# Patient Record
Sex: FEMALE | Race: WHITE | Employment: OTHER | URBAN - METROPOLITAN AREA
[De-identification: names, ages, dates, MRNs, and addresses within clinical notes are randomized per-mention and may not be internally consistent; named-entity substitution may affect disease eponyms.]

---

## 2018-02-13 ENCOUNTER — THERAPY VISIT (OUTPATIENT)
Dept: PHYSICAL THERAPY | Facility: CLINIC | Age: 73
End: 2018-02-13
Payer: COMMERCIAL

## 2018-02-13 DIAGNOSIS — G89.29 CHRONIC RIGHT SHOULDER PAIN: Primary | ICD-10-CM

## 2018-02-13 DIAGNOSIS — M25.511 CHRONIC RIGHT SHOULDER PAIN: Primary | ICD-10-CM

## 2018-02-13 PROCEDURE — 97161 PT EVAL LOW COMPLEX 20 MIN: CPT | Mod: GP | Performed by: PHYSICAL THERAPIST

## 2018-02-13 PROCEDURE — G8985 CARRY GOAL STATUS: HCPCS | Mod: GP | Performed by: PHYSICAL THERAPIST

## 2018-02-13 PROCEDURE — G8984 CARRY CURRENT STATUS: HCPCS | Mod: GP | Performed by: PHYSICAL THERAPIST

## 2018-02-13 PROCEDURE — 97110 THERAPEUTIC EXERCISES: CPT | Mod: GP | Performed by: PHYSICAL THERAPIST

## 2018-02-13 NOTE — PROGRESS NOTES
[facility] Initial Evaluation  Subjective:  Patient is a 72 year old female presenting with rehab right shoulder hpi.   Desire Stark is a 72 year old female with a right shoulder condition.  Occurance: Patient reports she played years of softball and has had pain for years that is just getting worse.  Condition occurred: for unknown reasons.  This is a chronic condition  2/1/18 Date of MD order  .    Patient reports pain:  Upper arm, anterior and posterior.  Radiates to:  Shoulder and upper arm.  Pain is described as stabbing and is intermittent and reported as 7/10.  Associated symptoms:  Loss of strength and loss of motion/stiffness. Pain is the same all the time.  Exacerbated by: taking a kettle of stove, lifting arm to curl hair, lifting a gallon a milk out of the fridge, unhooking her bra. Relieved by: biofreeze.  Since onset symptoms are gradually worsening.        General health as reported by patient is good.  Pertinent medical history includes:  Overweight and osteoarthritis.  Medical allergies: no.  Other surgeries include:  Heart surgery (2 stints).  Current medications:  Cardiac medication.  Current occupation is retired.    Employment tasks: househould tasks.    Barriers include:  None as reported by the patient.    Red flags:  None as reported by the patient.                        Objective:  Standing Alignment:    Cervical/Thoracic:  Forward head  Shoulder/UE:  Rounded shoulders                                       Shoulder Evaluation:  ROM:  AROM:    Flexion:  Left:  158    Right:  122  Extension: Left: 80Right: 29  Abduction:  Left: 170   Right:  52      External Rotation:  Left:  75    Right:  30            Extension/Internal Rotation:  Left:  T7    Right:  Right gluteal    PROM:    Flexion:  Right: 144 empty end feel    Extension:  Right:  30  Abduction:  Right:  88 with empty end feel      External Rotation:  Right:  30 with empty end feel                    Strength:     Flexion: Left:5/5 Strong/pain free    Pain:    Right: 4/5  Weak/painful     Pain:   Extension:  Left: 5/5  Strong/pain free    Pain:      Abduction:  Left: 5/5  Strong/pain free  Pain:    Right: 3/5   Weak/painful    Pain:    Internal Rotation:  Left:5/5   Strong/pain free    Pain:    Right: 2/5   Weak/painful    Pain:  External Rotation:   Left:5/5   Strong/pain free    Pain:   Right:3-/5   Weak/painful    Pain:        Elbow Flexion:  Left:5/5   Strong/pain free    Pain:    Right:3+/5   Weak/painful    Pain:  Elbow Extension:  Left:5/5   Strong/pain free    Pain:    Right:3/5   Weak/painful    Pain:    Special Tests:      Right shoulder positive for the following special tests:Impingement; Neural Tension and Rotator cuff tear  Palpation:      Right shoulder tenderness present at: Supraspinatus and Subscapularis  Right shoulder tenderness not present at:Infraspinatus; Deltoid; Levator; Upper Trap or Bicipital Groove                                     Anurag Cervical Evaluation      Movement Loss:  Protrusion (PRO): nil  Flexion (Flex): nil  Retraction (RET): nil  Extension (EXT): nil  Lateral Flexion Right (LF R): mod  Lateral Flexion Left (LF L): major  Rotation Right (ROT R): nil  Rotation Left (ROT L): nil  Test Movements:        RET EXT: During: no effect  After: no effect    Repeat RET EXT: During: no effect  After: no effect                                                                     ROS    Assessment/Plan:    Patient is a 72 year old female with right side shoulder complaints.    Patient has the following significant findings with corresponding treatment plan.                Diagnosis 1:  Right shoulder pain / GH arthritis  Pain -  hot/cold therapy, US, manual therapy, self management, education, directional preference exercise and home program  Decreased ROM/flexibility - manual therapy, therapeutic exercise, therapeutic activity and home program  Decreased joint mobility - manual therapy,  therapeutic exercise, therapeutic activity and home program  Decreased strength - therapeutic exercise, therapeutic activities and home program  Decreased function - therapeutic activities and home program  Impaired posture - neuro re-education, therapeutic activities and home program    Therapy Evaluation Codes:   1) History comprised of:   Personal factors that impact the plan of care:      Age and Time since onset of symptoms.    Comorbidity factors that impact the plan of care are:      Osteoarthritis.     Medications impacting care: Cardiac.  2) Examination of Body Systems comprised of:   Body structures and functions that impact the plan of care:      Shoulder.   Activity limitations that impact the plan of care are:      Bathing, Dressing, Lifting and Sleeping.  3) Clinical presentation characteristics are:   Stable/Uncomplicated.  4) Decision-Making    Low complexity using standardized patient assessment instrument and/or measureable assessment of functional outcome.  Cumulative Therapy Evaluation is: Low complexity.    Previous and current functional limitations:  (See Goal Flow Sheet for this information)    Short term and Long term goals: (See Goal Flow Sheet for this information)     Communication ability:  Patient appears to be able to clearly communicate and understand verbal and written communication and follow directions correctly.  Treatment Explanation - The following has been discussed with the patient:   RX ordered/plan of care  Anticipated outcomes  Possible risks and side effects  This patient would benefit from PT intervention to resume normal activities.   Rehab potential is good.    Frequency:  1 X week, once daily  Duration:  for 6 weeks  Discharge Plan:  Achieve all LTG.  Independent in home treatment program.  Reach maximal therapeutic benefit.    Please refer to the daily flowsheet for treatment today, total treatment time and time spent performing 1:1 timed codes.

## 2018-02-13 NOTE — LETTER
[facility] PHYSICAL THERAPY  [address]  [facility] [address]  [phone]    2018    Re: Desire Stark   :   1945  MRN:  [id number]   REFERRING PHYSICIAN:   Yuki Kaiser    [facility] PHYSICAL THERAPY  Date of Initial Evaluation:  18  Visits:  Rxs Used: 1  Reason for Referral:  Chronic right shoulder pain    EVALUATION SUMMARY    [place] [facility] Initial Evaluation    Subjective:  Patient is a 72 year old female presenting with rehab right shoulder hpi.   Desire Stark is a 72 year old female with a right shoulder condition.  Occurance: Patient reports she played years of softball and has had pain for years that is just getting worse.  Condition occurred: for unknown reasons. This is a chronic condition  18 Date of MD order.      Patient reports pain:  Upper arm, anterior and posterior.  Radiates to:  Shoulder and upper arm.  Pain is described as stabbing and is intermittent and reported as 7/10.  Associated symptoms:  Loss of strength and loss of motion/stiffness. Pain is the same all the time.  Exacerbated by: taking a kettle of stove, lifting arm to curl hair, lifting a gallon a milk out of the fridge, unhooking her bra. Relieved by: biofreeze.  Since onset symptoms are gradually worsening.  General health as reported by patient is good.      Pertinent medical history includes:  Overweight and osteoarthritis. Medical allergies: no.  Other surgeries include:  Heart surgery (2 stints).  Current medications:  Cardiac medication.  Current occupation is retired. Employment tasks: househould tasks.    Barriers include:  None as reported by the patient.    Red flags:  None as reported by the patient.    Objective:  Standing Alignment:    Cervical/Thoracic:  Forward head  Shoulder/UE:  Rounded shoulders    Shoulder Evaluation:  ROM:  AROM:    Flexion:  Left:  158    Right:  122  Extension: Left: 80Right: 29  Abduction:   Left: 170   Right:  52  External Rotation:  Left:  75    Right:  30  Extension/Internal Rotation:  Left:  T7    Right:  Right gluteal      PROM:    Flexion:  Right: 144 empty end feel    Extension:  Right:  30  Abduction:  Right:  88 with empty end feel  External Rotation:  Right:  30 with empty end feel    Strength:    Flexion: Left:5/5 Strong/pain free    Pain:    Right: 4/5  Weak/painful     Pain:   Extension:  Left: 5/5  Strong/pain free    Pain:      Abduction:  Left: 5/5  Strong/pain free  Pain:    Right: 3/5   Weak/painful    Pain:    Internal Rotation:  Left:5/5   Strong/pain free    Pain:    Right: 2/5   Weak/painful    Pain:  External Rotation:   Left:5/5   Strong/pain free    Pain:   Right:3-/5   Weak/painful    Pain:        Elbow Flexion:  Left:5/5   Strong/pain free    Pain:    Right:3+/5   Weak/painful    Pain:  Elbow Extension:  Left:5/5   Strong/pain free    Pain:    Right:3/5   Weak/painful    Pain:    Special Tests:    Right shoulder positive for the following special tests:Impingement; Neural Tension and Rotator cuff tear    Palpation:    Right shoulder tenderness present at: Supraspinatus and Subscapularis  Right shoulder tenderness not present at:Infraspinatus; Deltoid; Levator; Upper Trap or Bicipital Groove    Anurag Cervical Evaluation    Movement Loss:  Protrusion (PRO): nil  Flexion (Flex): nil  Retraction (RET): nil  Extension (EXT): nil  Lateral Flexion Right (LF R): mod  Lateral Flexion Left (LF L): major  Rotation Right (ROT R): nil  Rotation Left (ROT L): nil    Test Movements:  RET EXT: During: no effect  After: no effect    Repeat RET EXT: During: no effect  After: no effect      Assessment/Plan:    Patient is a 72 year old female with right side shoulder complaints.    Patient has the following significant findings with corresponding treatment plan.                  Diagnosis 1:  Right shoulder pain / GH arthritis  Pain -  hot/cold therapy, US, manual therapy, self management,  education, directional preference exercise and home program  Decreased ROM/flexibility - manual therapy, therapeutic exercise, therapeutic activity and home program  Decreased joint mobility - manual therapy, therapeutic exercise, therapeutic activity and home program  Decreased strength - therapeutic exercise, therapeutic activities and home program  Decreased function - therapeutic activities and home program  Impaired posture - neuro re-education, therapeutic activities and home program    Therapy Evaluation Codes:   1) History comprised of:   Personal factors that impact the plan of care:      Age and Time since onset of symptoms.    Comorbidity factors that impact the plan of care are:      Osteoarthritis.     Medications impacting care: Cardiac.  2) Examination of Body Systems comprised of:   Body structures and functions that impact the plan of care:      Shoulder.   Activity limitations that impact the plan of care are:      Bathing, Dressing, Lifting and Sleeping.  3) Clinical presentation characteristics are:   Stable/Uncomplicated.  4) Decision-Making    Low complexity using standardized patient assessment instrument and/or measureable assessment of functional outcome.  Cumulative Therapy Evaluation is: Low complexity.    Previous and current functional limitations:  (See Goal Flow Sheet for this information)    Short term and Long term goals: (See Goal Flow Sheet for this information)     Communication ability:  Patient appears to be able to clearly communicate and understand verbal and written communication and follow directions correctly.    Treatment Explanation - The following has been discussed with the patient:   RX ordered/plan of care  Anticipated outcomes  Possible risks and side effects    This patient would benefit from PT intervention to resume normal activities.   Rehab potential is good.  Frequency:  1 X week, once daily  Duration:  for 6 weeks  Discharge Plan:  Achieve all  LTG.  Independent in home treatment program.  Reach maximal therapeutic benefit.    Thank you for your referral.    INQUIRIES  Therapist: Pee Bailon DPT  [facility] PHYSICAL THERAPY  [address]  [facility] [address]  Phone: [phone]  Fax: [phone]

## 2019-02-06 PROBLEM — M25.511 CHRONIC RIGHT SHOULDER PAIN: Status: RESOLVED | Noted: 2018-02-13 | Resolved: 2019-02-06

## 2019-02-06 PROBLEM — G89.29 CHRONIC RIGHT SHOULDER PAIN: Status: RESOLVED | Noted: 2018-02-13 | Resolved: 2019-02-06

## 2019-10-25 ENCOUNTER — THERAPY VISIT (OUTPATIENT)
Dept: PHYSICAL THERAPY | Facility: CLINIC | Age: 74
End: 2019-10-25
Payer: COMMERCIAL

## 2019-10-25 DIAGNOSIS — M25.552 HIP PAIN, LEFT: ICD-10-CM

## 2019-10-25 DIAGNOSIS — M70.62 TROCHANTERIC BURSITIS OF LEFT HIP: ICD-10-CM

## 2019-10-25 DIAGNOSIS — M16.12 PRIMARY OSTEOARTHRITIS OF LEFT HIP: ICD-10-CM

## 2019-10-25 PROCEDURE — 97035 APP MDLTY 1+ULTRASOUND EA 15: CPT | Mod: GP | Performed by: PHYSICAL THERAPIST

## 2019-10-25 PROCEDURE — 97161 PT EVAL LOW COMPLEX 20 MIN: CPT | Mod: GP | Performed by: PHYSICAL THERAPIST

## 2019-10-25 PROCEDURE — 97530 THERAPEUTIC ACTIVITIES: CPT | Mod: GP | Performed by: PHYSICAL THERAPIST

## 2019-10-25 PROCEDURE — 97110 THERAPEUTIC EXERCISES: CPT | Mod: GP | Performed by: PHYSICAL THERAPIST

## 2019-10-25 ASSESSMENT — ACTIVITIES OF DAILY LIVING (ADL)
WALKING_APPROXIMATELY_10_MINUTES: EXTREME DIFFICULTY
STEPPING_UP_AND_DOWN_CURBS: MODERATE DIFFICULTY
WALKING_INITIALLY: MODERATE DIFFICULTY
HOS_ADL_SCORE(%): 33.82
HOS_ADL_ITEM_SCORE_TOTAL: 23
DEEP_SQUATTING: EXTREME DIFFICULTY
GETTING_INTO_AND_OUT_OF_AN_AVERAGE_CAR: MODERATE DIFFICULTY
RECREATIONAL_ACTIVITIES: EXTREME DIFFICULTY
WALKING_DOWN_STEEP_HILLS: EXTREME DIFFICULTY
ROLLING_OVER_IN_BED: MODERATE DIFFICULTY
SITTING_FOR_15_MINUTES: MODERATE DIFFICULTY
LIGHT_TO_MODERATE_WORK: MODERATE DIFFICULTY
GOING_UP_1_FLIGHT_OF_STAIRS: EXTREME DIFFICULTY
WALKING_15_MINUTES_OR_GREATER: EXTREME DIFFICULTY
HEAVY_WORK: EXTREME DIFFICULTY
HOS_ADL_HIGHEST_POTENTIAL_SCORE: 68
HOS_ADL_COUNT: 17
PUTTING_ON_SOCKS_AND_SHOES: EXTREME DIFFICULTY
STANDING_FOR_15_MINUTES: EXTREME DIFFICULTY
GOING_DOWN_1_FLIGHT_OF_STAIRS: EXTREME DIFFICULTY
TWISTING/PIVOTING_ON_INVOLVED_LEG: MODERATE DIFFICULTY
WALKING_UP_STEEP_HILLS: EXTREME DIFFICULTY
GETTING_INTO_AND_OUT_OF_A_BATHTUB: EXTREME DIFFICULTY
HOW_WOULD_YOU_RATE_YOUR_CURRENT_LEVEL_OF_FUNCTION_DURING_YOUR_USUAL_ACTIVITIES_OF_DAILY_LIVING_FROM_0_TO_100_WITH_100_BEING_YOUR_LEVEL_OF_FUNCTION_PRIOR_TO_YOUR_HIP_PROBLEM_AND_0_BEING_THE_INABILITY_TO_PERFORM_ANY_OF_YOUR_USUAL_DAILY_ACTIVITIES?: 50

## 2019-10-25 NOTE — LETTER
[facility]  [address] [place] [address]  [phone]    2019    Re: Desire Stark   :   1945  MRN:  [id number]   REFERRING PHYSICIAN:   Yuki Kaiser    [facility]    Date of Initial Evaluation:  10/25/2019  Visits:  Rxs Used: 1  Reason for Referral:     Hip pain, left  Trochanteric bursitis of left hip  Primary osteoarthritis of left hip    EVALUATION SUMMARY    [place] [facility] Initial Evaluation  Subjective:  Type of problem:  Left hip  Condition occurred with:  Degenerative joint disease (bursa).    Problem details: MD order 10/221/9. Desire played softball all her life. She took x ray and was diagnosed with left hip arthritis and bursa. She walking, stairs, sleeping on her left side everything hurts. The pain is gradually getting worse and was sent to PT for further management. .   Patient reports pain:  Greater trochanter.  Associated symptoms:  Loss of strength, loss of motion/stiffness and buckling/giving out. Symptoms are exacerbated by ascending stairs, descending stairs, walking, lying on extremity and bending/squatting and relieved by ice (ibuprofen).    Desire Stark being seen for left hip pain.   Date of Onset: few years ago. Where condition occurred: for unknown reasons.Problem occurred: DJD, bursa  and reported as 5/10 on pain scale. General health as reported by patient is fair. Pertinent medical history includes:  Heart problems, high blood pressure and rheumatoid arthritis. Other medical history details: 3 heart attack- 2 stents. Surgeries include:  Heart surgery. Other surgery history details: -  heart surgery.  Current medications:  High blood pressure medication and cardiac medication. Other medications details: asprin, vit D, fish oil.   Primary job tasks include:  Repetitive tasks.  Pain is described as aching, sharp and stabbing and is constant. Pain is the same all the time. Since onset  symptoms are gradually worsening. Special tests:  X-ray. Patient is Retired.   Barriers include:  None as reported by patient.  Red flags:  None as reported by patient.    Objective:  Gait:    Gait Type:  Antalgic     Flexibility/Screens:   Lower Extremity:  Decreased left lower extremity flexibility:Hip ER's; Piriformis; IT Band and Hamstrings  Decreased right lower extremity flexibility:  Hamstrings    Hip Evaluation  HIP AROM:    Flexion: Left: 62 degrees with pain level 5/10   Right:   Abduction: Left: 32 degrees with pain level 7/10    Right:   Internal Rotation: Left: paina t end range   Right:  External Rotation: Left: pain at end range   Right:    Hip Strength:    Flexion:   Left: 3-/5   ++  Pain:  Right: 4/5   Pain:            Extension:  Left: 3/5  Pain:Right: 3/5    Pain:    Abduction:  Left: 3-/5    +++   Pain:Right: 4-/5    Pain:  Adduction:  Left: 5/5    Pain:Right: 5/5   Pain:  Internal Rotation:  Left: 4/5   +   Pain:Right: 4/5   Pain:  External Rotation:  Left: 4/5   +  Pain:  Right: 4/5   Pain:      Hip Special Testing:    Left hip positive for the following special tests:  SLR     Hip Palpation:    Left hip tenderness present at:   Greater Trachanter; Gluteus Medius and Bursa    Assessment/Plan:    Patient is a 74 year old female with left side hip complaints.    Patient has the following significant findings with corresponding treatment plan.                Diagnosis 1:  Left hip pain- Bursitis, DJD  Pain -  hot/cold therapy, US, electric stimulation, manual therapy, STS, self management, education and home program  Decreased ROM/flexibility - manual therapy, therapeutic exercise, therapeutic activity and home program  Decreased strength - therapeutic exercise, therapeutic activities and home program  Inflammation - cold therapy, US, electric stimulation and self management/home program  Impaired gait - gait training and home program  Decreased function - therapeutic activities and home  program    Therapy Evaluation Codes:   1) History comprised of:   Personal factors that impact the plan of care:      Time since onset of symptoms.    Comorbidity factors that impact the plan of care are:      check HPI.     Medications impacting care: Check HPI.  2) Examination of Body Systems comprised of:   Body structures and functions that impact the plan of care:      Hip.   Activity limitations that impact the plan of care are:      Bending, Dressing, Lifting, Sitting, Squatting/kneeling, Stairs, Standing, Walking, Sleeping and Laying down.  3) Clinical presentation characteristics are:   Stable/Uncomplicated.  4) Decision-Making    Low complexity using standardized patient assessment instrument and/or measureable assessment of functional outcome.  Cumulative Therapy Evaluation is: Low complexity.    Previous and current functional limitations:  (See Goal Flow Sheet for this information)    Short term and Long term goals: (See Goal Flow Sheet for this information)     Communication ability:  Patient appears to be able to clearly communicate and understand verbal and written communication and follow directions correctly.  Treatment Explanation - The following has been discussed with the patient:   RX ordered/plan of care  Anticipated outcomes  Possible risks and side effects  This patient would benefit from PT intervention to resume normal activities.   Rehab potential is good.    Frequency:  1 X week, once daily  Duration:  for 6 weeks  Discharge Plan:  Achieve all LTG.  Independent in home treatment program.  Reach maximal therapeutic benefit.    Thank you for your referral.    INQUIRIES  Therapist: TOBI Owens [address] [address]  Phone: [phone]  Fax: [phone]

## 2019-10-25 NOTE — PROGRESS NOTES
[facility] Initial Evaluation  Subjective:    Type of problem:  Left hip   Condition occurred with:  Degenerative joint disease (bursa).    Problem details: MD order 10/221/9. Desire played softball all her life. She took x ray and was diagnosed with left hip arthritis and bursa. She walking, stairs, sleeping on her left side everything hurts. The pain is gradually getting worse and was sent to PT for further management. .   Patient reports pain:  Greater trochanter.  Associated symptoms:  Loss of strength, loss of motion/stiffness and buckling/giving out. Symptoms are exacerbated by ascending stairs, descending stairs, walking, lying on extremity and bending/squatting and relieved by ice (ibuprofen).    Desire BARKER Begin being seen for left hip pain.   Date of Onset: few years ago. Where condition occurred: for unknown reasons.Problem occurred: DJD, bursa  and reported as 5/10 on pain scale. General health as reported by patient is fair. Pertinent medical history includes:  Heart problems, high blood pressure and rheumatoid arthritis. Other medical history details: 3 heart attack- 2 stents .    Surgeries include:  Heart surgery. Other surgery history details: 2008- 2 heart surgery.  Current medications:  High blood pressure medication and cardiac medication. Other medications details: asprin, vit D, fish oil.   Primary job tasks include:  Repetitive tasks.  Pain is described as aching, sharp and stabbing and is constant. Pain is the same all the time. Since onset symptoms are gradually worsening. Special tests:  X-ray.     Patient is Retired.   Barriers include:  None as reported by patient.  Red flags:  None as reported by patient.                      Objective:    Gait:    Gait Type:  Antalgic         Flexibility/Screens:       Lower Extremity:  Decreased left lower extremity flexibility:Hip ER's; Piriformis; IT Band and Hamstrings    Decreased right lower extremity flexibility:   Hamstrings                                                 Hip Evaluation  HIP AROM:    Flexion: Left: 62 degrees with pain level 5/10   Right:       Abduction: Left: 32 degrees with pain level 7/10    Right:       Internal Rotation: Left: paina t end range   Right:  External Rotation: Left: pain at end range   Right:        Hip Strength:    Flexion:   Left: 3-/5   ++  Pain:  Right: 4/5   Pain:                    Extension:  Left: 3/5  Pain:Right: 3/5    Pain:    Abduction:  Left: 3-/5    +++   Pain:Right: 4-/5    Pain:  Adduction:  Left: 5/5    Pain:Right: 5/5   Pain:  Internal Rotation:  Left: 4/5   +   Pain:Right: 4/5   Pain:  External Rotation:  Left: 4/5   +  Pain:  Right: 4/5   Pain:            Hip Special Testing:    Left hip positive for the following special tests:  SLR      Hip Palpation:    Left hip tenderness present at:   Greater Trachanter; Gluteus Medius and Bursa               General     ROS    Assessment/Plan:    Patient is a 74 year old female with left side hip complaints.    Patient has the following significant findings with corresponding treatment plan.                Diagnosis 1:  Left hip pain- Bursitis, DJD  Pain -  hot/cold therapy, US, electric stimulation, manual therapy, STS, self management, education and home program  Decreased ROM/flexibility - manual therapy, therapeutic exercise, therapeutic activity and home program  Decreased strength - therapeutic exercise, therapeutic activities and home program  Inflammation - cold therapy, US, electric stimulation and self management/home program  Impaired gait - gait training and home program  Decreased function - therapeutic activities and home program    Therapy Evaluation Codes:   1) History comprised of:   Personal factors that impact the plan of care:      Time since onset of symptoms.    Comorbidity factors that impact the plan of care are:      check HPI.     Medications impacting care: Check HPI.  2) Examination of Body Systems  comprised of:   Body structures and functions that impact the plan of care:      Hip.   Activity limitations that impact the plan of care are:      Bending, Dressing, Lifting, Sitting, Squatting/kneeling, Stairs, Standing, Walking, Sleeping and Laying down.  3) Clinical presentation characteristics are:   Stable/Uncomplicated.  4) Decision-Making    Low complexity using standardized patient assessment instrument and/or measureable assessment of functional outcome.  Cumulative Therapy Evaluation is: Low complexity.    Previous and current functional limitations:  (See Goal Flow Sheet for this information)    Short term and Long term goals: (See Goal Flow Sheet for this information)     Communication ability:  Patient appears to be able to clearly communicate and understand verbal and written communication and follow directions correctly.  Treatment Explanation - The following has been discussed with the patient:   RX ordered/plan of care  Anticipated outcomes  Possible risks and side effects  This patient would benefit from PT intervention to resume normal activities.   Rehab potential is good.    Frequency:  1 X week, once daily  Duration:  for 6 weeks  Discharge Plan:  Achieve all LTG.  Independent in home treatment program.  Reach maximal therapeutic benefit.    Please refer to the daily flowsheet for treatment today, total treatment time and time spent performing 1:1 timed codes.

## 2019-11-05 ENCOUNTER — THERAPY VISIT (OUTPATIENT)
Dept: PHYSICAL THERAPY | Facility: CLINIC | Age: 74
End: 2019-11-05
Payer: COMMERCIAL

## 2019-11-05 DIAGNOSIS — M70.62 TROCHANTERIC BURSITIS OF LEFT HIP: ICD-10-CM

## 2019-11-05 DIAGNOSIS — M16.12 PRIMARY OSTEOARTHRITIS OF LEFT HIP: ICD-10-CM

## 2019-11-05 DIAGNOSIS — M25.552 HIP PAIN, LEFT: ICD-10-CM

## 2019-11-05 PROCEDURE — 97110 THERAPEUTIC EXERCISES: CPT | Mod: GP | Performed by: PHYSICAL THERAPIST

## 2019-11-05 PROCEDURE — 97112 NEUROMUSCULAR REEDUCATION: CPT | Mod: GP | Performed by: PHYSICAL THERAPIST

## 2019-11-12 ENCOUNTER — THERAPY VISIT (OUTPATIENT)
Dept: PHYSICAL THERAPY | Facility: CLINIC | Age: 74
End: 2019-11-12
Payer: COMMERCIAL

## 2019-11-12 DIAGNOSIS — M16.12 PRIMARY OSTEOARTHRITIS OF LEFT HIP: ICD-10-CM

## 2019-11-12 DIAGNOSIS — M25.552 HIP PAIN, LEFT: ICD-10-CM

## 2019-11-12 DIAGNOSIS — M70.62 TROCHANTERIC BURSITIS OF LEFT HIP: ICD-10-CM

## 2019-11-12 PROCEDURE — 97140 MANUAL THERAPY 1/> REGIONS: CPT | Mod: GP | Performed by: PHYSICAL THERAPIST

## 2019-11-12 PROCEDURE — 97035 APP MDLTY 1+ULTRASOUND EA 15: CPT | Mod: GP | Performed by: PHYSICAL THERAPIST

## 2019-11-12 PROCEDURE — 97110 THERAPEUTIC EXERCISES: CPT | Mod: GP | Performed by: PHYSICAL THERAPIST

## 2019-11-12 ASSESSMENT — ACTIVITIES OF DAILY LIVING (ADL)
STANDING_FOR_15_MINUTES: MODERATE DIFFICULTY
WALKING_APPROXIMATELY_10_MINUTES: MODERATE DIFFICULTY
WALKING_UP_STEEP_HILLS: EXTREME DIFFICULTY
GETTING_INTO_AND_OUT_OF_AN_AVERAGE_CAR: SLIGHT DIFFICULTY
ROLLING_OVER_IN_BED: MODERATE DIFFICULTY
HOS_ADL_SCORE(%): 45.59
HOW_WOULD_YOU_RATE_YOUR_CURRENT_LEVEL_OF_FUNCTION_DURING_YOUR_USUAL_ACTIVITIES_OF_DAILY_LIVING_FROM_0_TO_100_WITH_100_BEING_YOUR_LEVEL_OF_FUNCTION_PRIOR_TO_YOUR_HIP_PROBLEM_AND_0_BEING_THE_INABILITY_TO_PERFORM_ANY_OF_YOUR_USUAL_DAILY_ACTIVITIES?: 50
TWISTING/PIVOTING_ON_INVOLVED_LEG: MODERATE DIFFICULTY
HOS_ADL_ITEM_SCORE_TOTAL: 31
HEAVY_WORK: EXTREME DIFFICULTY
HOS_ADL_HIGHEST_POTENTIAL_SCORE: 68
GOING_DOWN_1_FLIGHT_OF_STAIRS: MODERATE DIFFICULTY
GETTING_INTO_AND_OUT_OF_A_BATHTUB: MODERATE DIFFICULTY
WALKING_DOWN_STEEP_HILLS: EXTREME DIFFICULTY
PUTTING_ON_SOCKS_AND_SHOES: MODERATE DIFFICULTY
STEPPING_UP_AND_DOWN_CURBS: SLIGHT DIFFICULTY
WALKING_INITIALLY: MODERATE DIFFICULTY
WALKING_15_MINUTES_OR_GREATER: EXTREME DIFFICULTY
LIGHT_TO_MODERATE_WORK: MODERATE DIFFICULTY
GOING_UP_1_FLIGHT_OF_STAIRS: MODERATE DIFFICULTY
DEEP_SQUATTING: MODERATE DIFFICULTY
SITTING_FOR_15_MINUTES: MODERATE DIFFICULTY
RECREATIONAL_ACTIVITIES: EXTREME DIFFICULTY
HOS_ADL_COUNT: 17

## 2020-01-10 PROBLEM — M70.62 TROCHANTERIC BURSITIS OF LEFT HIP: Status: RESOLVED | Noted: 2019-10-25 | Resolved: 2020-01-10

## 2020-01-10 PROBLEM — M16.12 DEGENERATIVE JOINT DISEASE OF LEFT HIP: Status: RESOLVED | Noted: 2019-10-25 | Resolved: 2020-01-10

## 2020-01-10 PROBLEM — M25.552 HIP PAIN, LEFT: Status: RESOLVED | Noted: 2019-10-25 | Resolved: 2020-01-10

## 2020-01-10 NOTE — PROGRESS NOTES
Discharge Note    Progress reporting period is from last progress note on   to Nov 12, 2019.    Desire failed to follow up and current status is unknown.  Please see information below for last relevant information on current status.  Patient seen for 3 visits.    SUBJECTIVE  Subjective changes noted by patient:  Desire is well, pain present over GT.  .  Current pain level is 4/10.     Previous pain level was   .   Changes in function:  Yes (See Goal flowsheet attached for changes in current functional level)  Adverse reaction to treatment or activity: None    OBJECTIVE  Changes noted in objective findings: Administered US and worked on soft tissue , reviewed the exercises today     ASSESSMENT/PLAN  Diagnosis: Left hip pain- Bursitis, DJD   Updated problem list and treatment plan:     STG/LTGs have been met or progress has been made towards goals:  Yes, please see goal flowsheet for most current information  Assessment of Progress: current status is unknown.    Last current status: Pt is progressing slower than anticipated   Self Management Plans:  HEP  I have re-evaluated this patient and find that the nature, scope, duration and intensity of the therapy is appropriate for the medical condition of the patient.  Desire continues to require the following intervention to meet STG and LTG's:  HEP.    Recommendations:  Discharge with current home program.  Patient to follow up with MD as needed.    Please refer to the daily flowsheet for treatment today, total treatment time and time spent performing 1:1 timed codes.